# Patient Record
Sex: FEMALE | Race: WHITE | NOT HISPANIC OR LATINO
[De-identification: names, ages, dates, MRNs, and addresses within clinical notes are randomized per-mention and may not be internally consistent; named-entity substitution may affect disease eponyms.]

---

## 2018-01-22 ENCOUNTER — RESULT REVIEW (OUTPATIENT)
Age: 58
End: 2018-01-22

## 2018-03-05 PROBLEM — Z00.00 ENCOUNTER FOR PREVENTIVE HEALTH EXAMINATION: Status: ACTIVE | Noted: 2018-03-05

## 2018-03-08 ENCOUNTER — APPOINTMENT (OUTPATIENT)
Dept: SURGERY | Facility: CLINIC | Age: 58
End: 2018-03-08
Payer: COMMERCIAL

## 2018-03-08 VITALS
SYSTOLIC BLOOD PRESSURE: 160 MMHG | HEIGHT: 61 IN | HEART RATE: 76 BPM | WEIGHT: 180 LBS | DIASTOLIC BLOOD PRESSURE: 94 MMHG | BODY MASS INDEX: 33.99 KG/M2

## 2018-03-08 DIAGNOSIS — Z86.39 PERSONAL HISTORY OF OTHER ENDOCRINE, NUTRITIONAL AND METABOLIC DISEASE: ICD-10-CM

## 2018-03-08 DIAGNOSIS — Z80.8 FAMILY HISTORY OF MALIGNANT NEOPLASM OF OTHER ORGANS OR SYSTEMS: ICD-10-CM

## 2018-03-08 DIAGNOSIS — Z78.9 OTHER SPECIFIED HEALTH STATUS: ICD-10-CM

## 2018-03-08 PROCEDURE — 99204 OFFICE O/P NEW MOD 45 MIN: CPT

## 2018-03-08 RX ORDER — LEVOTHYROXINE SODIUM 112 UG/1
112 TABLET ORAL
Refills: 0 | Status: ACTIVE | COMMUNITY

## 2018-04-11 ENCOUNTER — RESULT REVIEW (OUTPATIENT)
Age: 58
End: 2018-04-11

## 2018-04-25 ENCOUNTER — OUTPATIENT (OUTPATIENT)
Dept: OUTPATIENT SERVICES | Facility: HOSPITAL | Age: 58
LOS: 1 days | End: 2018-04-25
Payer: COMMERCIAL

## 2018-04-25 VITALS
WEIGHT: 177.91 LBS | DIASTOLIC BLOOD PRESSURE: 90 MMHG | HEART RATE: 72 BPM | SYSTOLIC BLOOD PRESSURE: 130 MMHG | TEMPERATURE: 98 F | HEIGHT: 61.5 IN | RESPIRATION RATE: 14 BRPM

## 2018-04-25 DIAGNOSIS — C73 MALIGNANT NEOPLASM OF THYROID GLAND: ICD-10-CM

## 2018-04-25 DIAGNOSIS — Z98.891 HISTORY OF UTERINE SCAR FROM PREVIOUS SURGERY: Chronic | ICD-10-CM

## 2018-04-25 LAB
BUN SERPL-MCNC: 14 MG/DL — SIGNIFICANT CHANGE UP (ref 7–23)
CALCIUM SERPL-MCNC: 9.6 MG/DL — SIGNIFICANT CHANGE UP (ref 8.4–10.5)
CHLORIDE SERPL-SCNC: 100 MMOL/L — SIGNIFICANT CHANGE UP (ref 98–107)
CO2 SERPL-SCNC: 28 MMOL/L — SIGNIFICANT CHANGE UP (ref 22–31)
CREAT SERPL-MCNC: 0.83 MG/DL — SIGNIFICANT CHANGE UP (ref 0.5–1.3)
GLUCOSE SERPL-MCNC: 87 MG/DL — SIGNIFICANT CHANGE UP (ref 70–99)
HCT VFR BLD CALC: 43.4 % — SIGNIFICANT CHANGE UP (ref 34.5–45)
HGB BLD-MCNC: 14.1 G/DL — SIGNIFICANT CHANGE UP (ref 11.5–15.5)
MCHC RBC-ENTMCNC: 30.3 PG — SIGNIFICANT CHANGE UP (ref 27–34)
MCHC RBC-ENTMCNC: 32.5 % — SIGNIFICANT CHANGE UP (ref 32–36)
MCV RBC AUTO: 93.1 FL — SIGNIFICANT CHANGE UP (ref 80–100)
NRBC # FLD: 0 — SIGNIFICANT CHANGE UP
PLATELET # BLD AUTO: 316 K/UL — SIGNIFICANT CHANGE UP (ref 150–400)
PMV BLD: 10.1 FL — SIGNIFICANT CHANGE UP (ref 7–13)
POTASSIUM SERPL-MCNC: 4.4 MMOL/L — SIGNIFICANT CHANGE UP (ref 3.5–5.3)
POTASSIUM SERPL-SCNC: 4.4 MMOL/L — SIGNIFICANT CHANGE UP (ref 3.5–5.3)
RBC # BLD: 4.66 M/UL — SIGNIFICANT CHANGE UP (ref 3.8–5.2)
RBC # FLD: 12.4 % — SIGNIFICANT CHANGE UP (ref 10.3–14.5)
SODIUM SERPL-SCNC: 141 MMOL/L — SIGNIFICANT CHANGE UP (ref 135–145)
WBC # BLD: 5.97 K/UL — SIGNIFICANT CHANGE UP (ref 3.8–10.5)
WBC # FLD AUTO: 5.97 K/UL — SIGNIFICANT CHANGE UP (ref 3.8–10.5)

## 2018-04-25 PROCEDURE — 93010 ELECTROCARDIOGRAM REPORT: CPT

## 2018-04-25 RX ORDER — SODIUM CHLORIDE 9 MG/ML
1000 INJECTION, SOLUTION INTRAVENOUS
Qty: 0 | Refills: 0 | Status: DISCONTINUED | OUTPATIENT
Start: 2018-05-07 | End: 2018-05-08

## 2018-04-25 NOTE — H&P PST ADULT - VISION (WITH CORRECTIVE LENSES IF THE PATIENT USUALLY WEARS THEM):
distance and reading glasses/Normal vision: sees adequately in most situations; can see medication labels, newsprint

## 2018-04-25 NOTE — H&P PST ADULT - PMH
Hypothyroidism    Malignant neoplasm of thyroid gland    Seasonal allergies Hypothyroidism    Malignant neoplasm of thyroid gland    Obesity    Seasonal allergies

## 2018-04-25 NOTE — H&P PST ADULT - HISTORY OF PRESENT ILLNESS
59y/o female prsents for preop eval fro scheduled tota 57y/o female prsents for preop eval fro scheduled total thyroidectomy on 5/7/2018. Pt states dx with thyroid nodules several yrs ago, followed by PCP, increased growth on recent imaging and recent biopsy positive for malignancy. Pt with c/o occasional voice disturbance.

## 2018-04-25 NOTE — H&P PST ADULT - PROBLEM SELECTOR PLAN 1
scheduled total thyroidectomy on 5/7/2018.   preop instructions, gi prophylaxis & surgical soap given  pt verbalized understanding

## 2018-05-06 ENCOUNTER — TRANSCRIPTION ENCOUNTER (OUTPATIENT)
Age: 58
End: 2018-05-06

## 2018-05-07 ENCOUNTER — OTHER (OUTPATIENT)
Age: 58
End: 2018-05-07

## 2018-05-07 ENCOUNTER — APPOINTMENT (OUTPATIENT)
Dept: SURGERY | Facility: HOSPITAL | Age: 58
End: 2018-05-07

## 2018-05-07 ENCOUNTER — OUTPATIENT (OUTPATIENT)
Dept: OUTPATIENT SERVICES | Facility: HOSPITAL | Age: 58
LOS: 1 days | Discharge: HOME | End: 2018-05-07
Payer: COMMERCIAL

## 2018-05-07 VITALS
SYSTOLIC BLOOD PRESSURE: 120 MMHG | DIASTOLIC BLOOD PRESSURE: 70 MMHG | HEART RATE: 74 BPM | OXYGEN SATURATION: 98 % | RESPIRATION RATE: 14 BRPM

## 2018-05-07 VITALS
TEMPERATURE: 98 F | OXYGEN SATURATION: 98 % | HEIGHT: 61.5 IN | RESPIRATION RATE: 16 BRPM | DIASTOLIC BLOOD PRESSURE: 94 MMHG | WEIGHT: 177.91 LBS | HEART RATE: 78 BPM | SYSTOLIC BLOOD PRESSURE: 133 MMHG

## 2018-05-07 DIAGNOSIS — Z98.891 HISTORY OF UTERINE SCAR FROM PREVIOUS SURGERY: Chronic | ICD-10-CM

## 2018-05-07 DIAGNOSIS — C73 MALIGNANT NEOPLASM OF THYROID GLAND: ICD-10-CM

## 2018-05-07 LAB
CALCIUM SERPL-MCNC: 8.9 MG/DL — SIGNIFICANT CHANGE UP (ref 8.4–10.5)
CALCIUM SERPL-MCNC: 9 MG/DL — SIGNIFICANT CHANGE UP (ref 8.4–10.5)

## 2018-05-07 PROCEDURE — 60252 REMOVAL OF THYROID: CPT | Mod: AS

## 2018-05-07 PROCEDURE — 13132 CMPLX RPR F/C/C/M/N/AX/G/H/F: CPT | Mod: 59

## 2018-05-07 PROCEDURE — 60252 REMOVAL OF THYROID: CPT

## 2018-05-07 RX ORDER — OXYCODONE AND ACETAMINOPHEN 5; 325 MG/1; MG/1
1 TABLET ORAL EVERY 4 HOURS
Qty: 0 | Refills: 0 | Status: DISCONTINUED | OUTPATIENT
Start: 2018-05-07 | End: 2018-05-08

## 2018-05-07 RX ORDER — ACETAMINOPHEN 500 MG
650 TABLET ORAL EVERY 6 HOURS
Qty: 0 | Refills: 0 | Status: DISCONTINUED | OUTPATIENT
Start: 2018-05-07 | End: 2018-05-08

## 2018-05-07 RX ORDER — CALCIUM CARBONATE 500(1250)
1 TABLET ORAL
Qty: 0 | Refills: 0 | Status: DISCONTINUED | OUTPATIENT
Start: 2018-05-07 | End: 2018-05-08

## 2018-05-07 RX ORDER — LEVOCETIRIZINE DIHYDROCHLORIDE 0.5 MG/ML
1 SOLUTION ORAL
Qty: 0 | Refills: 0 | COMMUNITY

## 2018-05-07 RX ORDER — LEVOTHYROXINE SODIUM 125 MCG
1 TABLET ORAL
Qty: 0 | Refills: 0 | COMMUNITY

## 2018-05-07 RX ORDER — CALCIUM GLUCONATE 100 MG/ML
1 VIAL (ML) INTRAVENOUS ONCE
Qty: 0 | Refills: 0 | Status: COMPLETED | OUTPATIENT
Start: 2018-05-07 | End: 2018-05-07

## 2018-05-07 RX ORDER — ACETAMINOPHEN 500 MG
2 TABLET ORAL
Qty: 0 | Refills: 0 | COMMUNITY
Start: 2018-05-07

## 2018-05-07 RX ORDER — CALCIUM CARBONATE 500(1250)
1 TABLET ORAL
Qty: 0 | Refills: 0 | COMMUNITY
Start: 2018-05-07

## 2018-05-07 RX ADMIN — SODIUM CHLORIDE 50 MILLILITER(S): 9 INJECTION, SOLUTION INTRAVENOUS at 09:45

## 2018-05-07 RX ADMIN — Medication 1 TABLET(S): at 13:27

## 2018-05-07 RX ADMIN — Medication 200 GRAM(S): at 09:43

## 2018-05-07 NOTE — ASU PATIENT PROFILE, ADULT - VISION (WITH CORRECTIVE LENSES IF THE PATIENT USUALLY WEARS THEM):
Partially impaired: cannot see medication labels or newsprint, but can see obstacles in path, and the surrounding layout; can count fingers at arm's length/distance and reading glasses

## 2018-05-07 NOTE — ASU DISCHARGE PLAN (ADULT/PEDIATRIC). - MEDICATION SUMMARY - MEDICATIONS TO TAKE
I will START or STAY ON the medications listed below when I get home from the hospital:    acetaminophen 325 mg oral tablet  -- 2 tab(s) by mouth every 6 hours, As needed, Moderate Pain (4 - 6)  -- Indication: For Malignant neoplasm of thyroid gland    calcium carbonate 500 mg (200 mg elemental calcium) oral tablet, chewable  -- 1 tab(s) by mouth 4 times a day  -- Indication: For Malignant neoplasm of thyroid gland    Xyzal 5 mg oral tablet  -- 1 tab(s) by mouth once a day (in the evening)  -- Indication: For Malignant neoplasm of thyroid gland    Synthroid 112 mcg (0.112 mg) oral tablet  -- 1 tab(s) by mouth once a day  -- Indication: For Malignant neoplasm of thyroid gland

## 2018-05-07 NOTE — ASU DISCHARGE PLAN (ADULT/PEDIATRIC). - NURSING INSTRUCTIONS
You were given 1000mg IV Tylenol for pain management.  Please DO NOT take any Tylenol containing products, such as  Vicodin, Percocet, Excedrin, many cold preparations for the next 6 hours (until 245p).  DO NOT EXCEED 3000MG OF TYLENOL OVER 24 HOURS.   Please DO Not take Motrin/Ibuprofen/Advil/Aleve (NSAIDS) for the next several days.

## 2018-05-08 ENCOUNTER — RESULT REVIEW (OUTPATIENT)
Age: 58
End: 2018-05-08

## 2018-05-08 ENCOUNTER — APPOINTMENT (OUTPATIENT)
Dept: SURGERY | Facility: CLINIC | Age: 58
End: 2018-05-08
Payer: COMMERCIAL

## 2018-05-08 PROCEDURE — 88307 TISSUE EXAM BY PATHOLOGIST: CPT | Mod: 26

## 2018-05-08 PROCEDURE — 99024 POSTOP FOLLOW-UP VISIT: CPT

## 2018-05-10 LAB — SURGICAL PATHOLOGY STUDY: SIGNIFICANT CHANGE UP

## 2018-05-17 ENCOUNTER — OTHER (OUTPATIENT)
Age: 58
End: 2018-05-17

## 2018-06-21 ENCOUNTER — APPOINTMENT (OUTPATIENT)
Dept: SURGERY | Facility: CLINIC | Age: 58
End: 2018-06-21
Payer: COMMERCIAL

## 2018-06-21 PROCEDURE — 99024 POSTOP FOLLOW-UP VISIT: CPT

## 2018-06-21 RX ORDER — LEVOTHYROXINE SODIUM 0.05 MG/1
50 TABLET ORAL
Qty: 12 | Refills: 0 | Status: ACTIVE | COMMUNITY
Start: 2018-06-08

## 2018-11-08 ENCOUNTER — APPOINTMENT (OUTPATIENT)
Dept: SURGERY | Facility: CLINIC | Age: 58
End: 2018-11-08
Payer: COMMERCIAL

## 2018-11-08 PROCEDURE — 99213 OFFICE O/P EST LOW 20 MIN: CPT

## 2019-05-01 PROBLEM — J30.2 OTHER SEASONAL ALLERGIC RHINITIS: Chronic | Status: ACTIVE | Noted: 2018-04-25

## 2019-05-01 PROBLEM — E03.9 HYPOTHYROIDISM, UNSPECIFIED: Chronic | Status: ACTIVE | Noted: 2018-04-25

## 2019-05-01 PROBLEM — E66.9 OBESITY, UNSPECIFIED: Chronic | Status: ACTIVE | Noted: 2018-04-25

## 2019-05-01 PROBLEM — C73 MALIGNANT NEOPLASM OF THYROID GLAND: Chronic | Status: ACTIVE | Noted: 2018-04-25

## 2019-05-20 ENCOUNTER — APPOINTMENT (OUTPATIENT)
Dept: NUCLEAR MEDICINE | Facility: CLINIC | Age: 59
End: 2019-05-20
Payer: COMMERCIAL

## 2019-05-20 ENCOUNTER — OUTPATIENT (OUTPATIENT)
Dept: OUTPATIENT SERVICES | Facility: HOSPITAL | Age: 59
LOS: 1 days | End: 2019-05-20

## 2019-05-20 DIAGNOSIS — Z00.8 ENCOUNTER FOR OTHER GENERAL EXAMINATION: ICD-10-CM

## 2019-05-20 DIAGNOSIS — Z98.891 HISTORY OF UTERINE SCAR FROM PREVIOUS SURGERY: Chronic | ICD-10-CM

## 2019-05-21 ENCOUNTER — TRANSCRIPTION ENCOUNTER (OUTPATIENT)
Age: 59
End: 2019-05-21

## 2019-05-21 ENCOUNTER — APPOINTMENT (OUTPATIENT)
Dept: NUCLEAR MEDICINE | Facility: CLINIC | Age: 59
End: 2019-05-21

## 2019-05-22 ENCOUNTER — APPOINTMENT (OUTPATIENT)
Dept: NUCLEAR MEDICINE | Facility: CLINIC | Age: 59
End: 2019-05-22

## 2019-05-24 ENCOUNTER — APPOINTMENT (OUTPATIENT)
Dept: NUCLEAR MEDICINE | Facility: CLINIC | Age: 59
End: 2019-05-24

## 2019-05-24 PROCEDURE — 78018 THYROID MET IMAGING BODY: CPT | Mod: 26

## 2019-07-17 NOTE — ASU PATIENT PROFILE, ADULT - MEDICATION ADMINISTRATION INFO, PROFILE
Price (Use Numbers Only, No Special Characters Or $): 935 Price (Use Numbers Only, No Special Characters Or $): 597 no concerns

## 2019-11-13 ENCOUNTER — APPOINTMENT (OUTPATIENT)
Dept: ORTHOPEDIC SURGERY | Facility: CLINIC | Age: 59
End: 2019-11-13
Payer: COMMERCIAL

## 2019-11-13 VITALS
HEIGHT: 61 IN | SYSTOLIC BLOOD PRESSURE: 141 MMHG | WEIGHT: 180 LBS | HEART RATE: 79 BPM | DIASTOLIC BLOOD PRESSURE: 82 MMHG | BODY MASS INDEX: 33.99 KG/M2

## 2019-11-13 PROCEDURE — 99204 OFFICE O/P NEW MOD 45 MIN: CPT

## 2019-11-13 PROCEDURE — 73562 X-RAY EXAM OF KNEE 3: CPT | Mod: RT

## 2019-11-13 NOTE — DISCUSSION/SUMMARY
[de-identified] : 59 year old  female with concern for medial meniscus tear of the right knee.  She was reassured that her right knee xrays obtained today showed no evidence of severe osteoarthritis nor acute orthopedic pathology. Based on her recent injury and physical exam findings I ordered an MRI of the right knee to rule-out medial meniscus tear. We also discussed use of NSAIDs sparingly for pain relief. She will call the office when MRI result is available.

## 2019-11-13 NOTE — ADDENDUM
[FreeTextEntry1] : I, Chung Hansen, acted solely as a scribe for Dr. Darin Kitchen on this date 11/13/2019.

## 2019-11-13 NOTE — PHYSICAL EXAM
[LE] : Sensory: Intact in bilateral lower extremities [ALL] : dorsalis pedis, posterior tibial, femoral, popliteal, and radial 2+ and symmetric bilaterally [Normal] : Alert and in no acute distress [Obese] : obese [Antalgic] : not antalgic [Poor Appearance] : well-appearing [Acute Distress] : not in acute distress [de-identified] : GENERAL APPEARANCE: Well nourished and hydrated, pleasant, alert, and oriented x 3. Appears their stated age. \par HEENT: Normocephalic, extraocular eye motion intact. Nasal septum midline. Oral cavity clear. External auditory canal clear. \par RESPIRATORY: Breath sounds clear and audible in all lobes. No wheezing, No accessory muscle use.\par CARDIOVASCULAR: No apparent abnormalities. No lower leg edema. No varicosities. Pedal pulses are palpable.\par NEUROLOGIC: Sensation is normal, no muscle weakness in the upper or lower extremities.\par DERMATOLOGIC: No apparent skin lesions, moist, warm, no rash.\par SPINE: Cervical spine appears normal and moves freely; thoracic spine appears normal and moves freely; lumbosacral spine appears normal and moves freely, normal, nontender.\par MUSCULOSKELETAL: Hands, wrists, and elbows are normal and move freely, shoulders are normal and move freely.  [de-identified] : Right knee exam shows medial joint line tenderness, pain with flexion, moderate joint effusion present, neutral alignment, preserved ROM, positive medial Kobe's test.  [de-identified] : 3V Xray of the right knee done in office today and reviewed by Dr. Darin Kitchen demonstrates well-preserved joint without findings or any evidence of fracture, dislocation, or any other acute orthopedic pathology. There is no radiographic features of severe OA present. \par \par \par

## 2019-11-13 NOTE — HISTORY OF PRESENT ILLNESS
[7] : a current pain level of 7/10 [Intermit.] : ~He/She~ states the symptoms seem to be intermittent [Bending] : worsened by bending [Walking] : worsened by walking [Rest] : relieved by rest [Recumbency] : relieved by recumbency [de-identified] : Patient is a 59 year old female who presents c/o right knee pain since September 2019 patient states was getting out of car and felt pinch to knee. patient states pain located to posterior and medial knee. patient states positive swelling. patient states has pain with flexion of knee.  Describes pain as burning and throbbing. patient PMD performed xray, and diclofenac as needed for pain.  patient states pain started to decrease last week, however suddenly returned. She c/o intermittent locking to the right knee.

## 2019-11-13 NOTE — REVIEW OF SYSTEMS
[Joint Pain] : joint pain [Joint Stiffness] : joint stiffness [Joint Swelling] : joint swelling [Negative] : Endocrine [FreeTextEntry9] : right knee

## 2019-12-27 ENCOUNTER — APPOINTMENT (OUTPATIENT)
Dept: ORTHOPEDIC SURGERY | Facility: CLINIC | Age: 59
End: 2019-12-27
Payer: COMMERCIAL

## 2019-12-27 VITALS
WEIGHT: 185 LBS | SYSTOLIC BLOOD PRESSURE: 131 MMHG | HEART RATE: 74 BPM | DIASTOLIC BLOOD PRESSURE: 85 MMHG | BODY MASS INDEX: 34.48 KG/M2 | HEIGHT: 61.5 IN

## 2019-12-27 DIAGNOSIS — S83.241A OTHER TEAR OF MEDIAL MENISCUS, CURRENT INJURY, RIGHT KNEE, INITIAL ENCOUNTER: ICD-10-CM

## 2019-12-27 PROCEDURE — 20610 DRAIN/INJ JOINT/BURSA W/O US: CPT | Mod: RT

## 2019-12-27 PROCEDURE — 99214 OFFICE O/P EST MOD 30 MIN: CPT | Mod: 25

## 2019-12-27 NOTE — HISTORY OF PRESENT ILLNESS
[Pain Location] : pain [Worsening] : worsening [Intermit.] : ~He/She~ states the symptoms seem to be intermittent [Bending] : worsened by bending [Walking] : worsened by walking [Rest] : relieved by rest [de-identified] : Patient is a 59 year old female who presents for follow up of right knee. patient was seen in November 2019 for pain.  patient was sent for MRI, states pain has increased since. patient states pain is located to medial knee, and she c/o sensations of pressure and tightness to anterior knee. patient states WB and driving increases pain.  patient has not had PT for pain.  patient denies buckling or locking. Patient using Diclofenac gel without relief.

## 2019-12-27 NOTE — ADDENDUM
[FreeTextEntry1] : I, Chung Hansen, acted solely as a scribe for Dr. Darin Kitchen on this date 12/27/2019.

## 2019-12-27 NOTE — PROCEDURE
[de-identified] : I injected the patient's right knee today with cortisone.\par \par I discussed at length with the patient the planned steroid and lidocaine injection. The risks, benefits, convalescence and alternatives were reviewed. The possible side effects discussed included but were not limited to: pain, swelling, heat, bleeding, and redness. Symptoms are generally mild but if they are extensive then contact the office. Giving pain relievers by mouth such as NSAIDs or Tylenol can generally treat the reactions to steroid and lidocaine. Rare cases of infection have been noted. Rash, hives and itching may occur post injection. If you have muscle pain or cramps, flushing and or swelling of the face, rapid heart beat, nausea, dizziness, fever, chills, headache, difficulty breathing, swelling in the arms or legs, or have a prickly feeling of your skin, contact a health care provider immediately. Following this discussion, the knee was prepped with Alcohol and under sterile condition the 80 mg Depo-Medrol and 6 cc Lidocaine injection was performed with a 20 gauge needle through a superolateral injection site. The needle was introduced into the joint, aspiration was performed to ensure intra-articular placement and the medication was injected. Upon withdrawal of the needle the site was cleaned with alcohol and a band aid applied. The patient tolerated the injection well and there were no adverse effects. Post injection instructions included no strenuous activity for 24 hours, cryotherapy and if there are any adverse effects to contact the office. \par \par \par

## 2019-12-27 NOTE — DISCUSSION/SUMMARY
[Surgical risks reviewed] : Surgical risks reviewed [de-identified] : 59 year old  female with right knee medial meniscus tear. \par We discussed her right knee MRI result, and based on imaging she is a candidate for right knee arthroscopy. She may consider pursuing surgery after a trial of conservative treatment. She elected to receive a cortisone injection in her right knee which she tolerated well. I encouraged her to begin physical therapy and I provided her with rx for outpatient PT. She may follow-up in 6 weeks for reassessment. \par \par The percentages of success in an arthroscopy that involves a torn meniscus and arthritic changes is dependent upon how bad the arthritic changes are. Basically, removing a meniscal tear allows us to ascertain how bad the patient's articular cartilage destruction (arthritis) is. The arthroscopy cleans out any debris from the arthritic process as well as removing the meniscal tear. Approximately 75% of the patients will say that they feel relief, although their x-rays will continue to show significant arthritic changes. Arthroscopy for arthritis is a temporizing procedure, yielding subjective success (patient satisfaction) for less than two to five years. In some cases, the knee might eventually require a knee replacement for symptomatic relief. The prognostic factors that are somewhat favorable predictive values in arthroscopic debridements (removal of loose articular cartilage, loose body and inflamed synovium) of an arthritic knee are: short duration of symptoms, effusion (swelling), minimal deformity and good range of motion. The complications with any arthroscopy include the risk of anaesthetic complications and death, blood clots and pulmonary embolus, infection (less than 1%), nerve damage, by which we would mean a peroneal palsy (less than 0.1%) (small area of skin numbness is so common, we do not consider its presence a complication), injury to the popliteal artery, which is so rare that there are no statistics, but should it occur could theoretically lead to amputation, which is extremely unlikely. There is often a chance of getting a hemarthrosis (blood in the joint) but this usually resolves with local measures of icing, physical therapy, and aspiration. Reflex sympathetic dystrophy (RSD) is another extremely rare but theoretical complication. This (RSD) means that the patient has a stiff painful joint that is out of proportion to the objective pathology of the knee. Subsequently, it might require years of physical therapy before one regains a functional knee with RSD. Infrapatellar contracture syndrome (stiff joint) is sometimes reported and associated with RSD, but it usually is a result of not being aggressive in physical therapy. I think the patient understands the risk benefit ratio of arthroscopy and will think about whether they would prefer the nonoperative or surgical treatment option. \par \par \par

## 2019-12-27 NOTE — PHYSICAL EXAM
[LE] : Sensory: Intact in bilateral lower extremities [ALL] : dorsalis pedis, posterior tibial, femoral, popliteal, and radial 2+ and symmetric bilaterally [Normal] : Alert and in no acute distress [Obese] : obese [Antalgic] : not antalgic [Acute Distress] : not in acute distress [Poor Appearance] : well-appearing [de-identified] : GENERAL APPEARANCE: Well nourished and hydrated, pleasant, alert, and oriented x 3. Appears their stated age. \par HEENT: Normocephalic, extraocular eye motion intact. Nasal septum midline. Oral cavity clear. External auditory canal clear. \par RESPIRATORY: Breath sounds clear and audible in all lobes. No wheezing, No accessory muscle use.\par CARDIOVASCULAR: No apparent abnormalities. No lower leg edema. No varicosities. Pedal pulses are palpable.\par NEUROLOGIC: Sensation is normal, no muscle weakness in the upper or lower extremities.\par DERMATOLOGIC: No apparent skin lesions, moist, warm, no rash.\par SPINE: Cervical spine appears normal and moves freely; thoracic spine appears normal and moves freely; lumbosacral spine appears normal and moves freely, normal, nontender.\par MUSCULOSKELETAL: Hands, wrists, and elbows are normal and move freely, shoulders are normal and move freely.  [de-identified] : right hip exam shows able to SLR without pain, no pain with Stinchfield maneuver, FROM. \par Right knee exam shows medial joint line tenderness, mild lateral joint line tenderness, pain with flexion, slight joint effusion present, neutral alignment, preserved ROM, positive medial Kobe's test. \par  [de-identified] : MRI of the right knee obtained 11/25/2019 at [facility] shows small joint effusion. Minimal lateral patellar subluxation. Chronic low-grade sprain of the medial collateral ligament as detailed above. Medial meniscus tear. Small full-thickness chondral fissure over the femoral trochlear sulcus.

## 2020-01-02 ENCOUNTER — APPOINTMENT (OUTPATIENT)
Dept: SURGERY | Facility: CLINIC | Age: 60
End: 2020-01-02
Payer: COMMERCIAL

## 2020-01-02 PROCEDURE — 99213 OFFICE O/P EST LOW 20 MIN: CPT

## 2020-01-02 NOTE — HISTORY OF PRESENT ILLNESS
[de-identified] : 1 1/2 years  s/p total thyroidectomy for malignancy. feels well on  Synthroid 112 mcg 6 doses weekly  and 50 mcg on Sunday. denies dysphagia, hoarseness or perioral or  hand tingling. no changes medically since last visit. apparently had workup for cervical adenopathy which has since resolved - benign cytology , normal Thyrogen scan.  TFT's reportedly normal

## 2020-01-02 NOTE — PHYSICAL EXAM
[de-identified] : well healed scar [de-identified] : no palpable thyroid bed nodules [Laryngoscopy Performed] : laryngoscopy was performed, see procedure section for findings [Midline] : located in midline position [Normal] : cranial nerves 2-12 intact [de-identified] : negative Chvostek's sign

## 2020-03-27 ENCOUNTER — APPOINTMENT (OUTPATIENT)
Dept: ORTHOPEDIC SURGERY | Facility: CLINIC | Age: 60
End: 2020-03-27

## 2020-09-21 ENCOUNTER — APPOINTMENT (OUTPATIENT)
Dept: ORTHOPEDIC SURGERY | Facility: CLINIC | Age: 60
End: 2020-09-21
Payer: COMMERCIAL

## 2020-09-21 VITALS
HEART RATE: 95 BPM | BODY MASS INDEX: 34.48 KG/M2 | DIASTOLIC BLOOD PRESSURE: 77 MMHG | WEIGHT: 185 LBS | HEIGHT: 61.5 IN | SYSTOLIC BLOOD PRESSURE: 133 MMHG

## 2020-09-21 DIAGNOSIS — M17.11 UNILATERAL PRIMARY OSTEOARTHRITIS, RIGHT KNEE: ICD-10-CM

## 2020-09-21 PROCEDURE — 99214 OFFICE O/P EST MOD 30 MIN: CPT | Mod: 25

## 2020-09-21 PROCEDURE — 73562 X-RAY EXAM OF KNEE 3: CPT | Mod: 50

## 2020-09-21 PROCEDURE — 20610 DRAIN/INJ JOINT/BURSA W/O US: CPT | Mod: LT

## 2020-09-21 RX ORDER — MELOXICAM 15 MG/1
15 TABLET ORAL DAILY
Qty: 30 | Refills: 1 | Status: ACTIVE | COMMUNITY
Start: 2020-09-21 | End: 1900-01-01

## 2020-09-21 NOTE — PROCEDURE
[de-identified] : I injected the patient's left  knee today with cortisone.\par \par I discussed at length with the patient the planned steroid and lidocaine injection. The risks, benefits, convalescence and alternatives were reviewed. The possible side effects discussed included but were not limited to: pain, swelling, heat, bleeding, and redness. Symptoms are generally mild but if they are extensive then contact the office. Giving pain relievers by mouth such as NSAIDs or Tylenol can generally treat the reactions to steroid and lidocaine. Rare cases of infection have been noted. Rash, hives and itching may occur post injection. If you have muscle pain or cramps, flushing and or swelling of the face, rapid heart beat, nausea, dizziness, fever, chills, headache, difficulty breathing, swelling in the arms or legs, or have a prickly feeling of your skin, contact a health care provider immediately. Following this discussion, the knee was prepped with Alcohol and under sterile condition the 80 mg Depo-Medrol and 6 cc Lidocaine injection was performed with a 20 gauge needle through a superolateral injection site. The needle was introduced into the joint, aspiration was performed to ensure intra-articular placement and the medication was injected. Upon withdrawal of the needle the site was cleaned with alcohol and a band aid applied. The patient tolerated the injection well and there were no adverse effects. Post injection instructions included no strenuous activity for 24 hours, cryotherapy and if there are any adverse effects to contact the office. \par \par \par

## 2020-09-21 NOTE — DISCUSSION/SUMMARY
[Surgical risks reviewed] : Surgical risks reviewed [de-identified] : 60 year old female with right knee medial meniscus tear and mild osteoarthritis of the b/l knees. Conservative therapy and surgical options discussed in detail with the patient. Pt opted to receive a cortisone injection in the left knee today and tolerated it well. If the patient does not receive relief from the cortisone injection, she will get an MRI of her right knee. We prescribed meloxicam today. She will take it once a day for two weeks and then will take it as needed. F/u with us in three months for repeat cortisone injections if needed. \par \par \par The percentages of success in an arthroscopy that involves a torn meniscus and arthritic changes is dependent upon how bad the arthritic changes are. Basically, removing a meniscal tear allows us to ascertain how bad the patient's articular cartilage destruction (arthritis) is. The arthroscopy cleans out any debris from the arthritic process as well as removing the meniscal tear. Approximately 75% of the patients will say that they feel relief, although their x-rays will continue to show significant arthritic changes. Arthroscopy for arthritis is a temporizing procedure, yielding subjective success (patient satisfaction) for less than two to five years. In some cases, the knee might eventually require a knee replacement for symptomatic relief. The prognostic factors that are somewhat favorable predictive values in arthroscopic debridements (removal of loose articular cartilage, loose body and inflamed synovium) of an arthritic knee are: short duration of symptoms, effusion (swelling), minimal deformity and good range of motion. The complications with any arthroscopy include the risk of anaesthetic complications and death, blood clots and pulmonary embolus, infection (less than 1%), nerve damage, by which we would mean a peroneal palsy (less than 0.1%) (small area of skin numbness is so common, we do not consider its presence a complication), injury to the popliteal artery, which is so rare that there are no statistics, but should it occur could theoretically lead to amputation, which is extremely unlikely. There is often a chance of getting a hemarthrosis (blood in the joint) but this usually resolves with local measures of icing, physical therapy, and aspiration. Reflex sympathetic dystrophy (RSD) is another extremely rare but theoretical complication. This (RSD) means that the patient has a stiff painful joint that is out of proportion to the objective pathology of the knee. Subsequently, it might require years of physical therapy before one regains a functional knee with RSD. Infrapatellar contracture syndrome (stiff joint) is sometimes reported and associated with RSD, but it usually is a result of not being aggressive in physical therapy. I think the patient understands the risk benefit ratio of arthroscopy and will think about whether they would prefer the nonoperative or surgical treatment option. \par

## 2020-09-21 NOTE — HISTORY OF PRESENT ILLNESS
[Pain Location] : pain [Worsening] : worsening [Intermit.] : ~He/She~ states the symptoms seem to be intermittent [Bending] : worsened by bending [Walking] : worsened by walking [Rest] : relieved by rest [de-identified] : 61 y/o F presents with b/l knee pain, left knee pain is worse than right knee pain. She notes pain with sitting and waling. Tylenol does not provide relief, but Advil does. Patient was seen in December 2019 for right knee pain. Patient was sent for MRI, states pain has increased since. Patient states pain is in the medial and lateral aspect of the b/l knees, and she c/o sensations of pressure and tightness to anterior knee. Patient states WB and driving increases pain. Patient has not had PT for pain. Patient denies buckling or locking. Patient using Diclofenac gel without relief.  [Ataxia] : no ataxia [Incontinence] : no incontinence [Loss of Dexterity] : good dexterity [Urinary Ret.] : no urinary retention

## 2020-09-21 NOTE — PHYSICAL EXAM
[LE] : Sensory: Intact in bilateral lower extremities [ALL] : dorsalis pedis, posterior tibial, femoral, popliteal, and radial 2+ and symmetric bilaterally [Normal] : Alert and in no acute distress [Obese] : obese [Antalgic] : not antalgic [Poor Appearance] : well-appearing [Acute Distress] : not in acute distress [de-identified] : GENERAL APPEARANCE: Well nourished and hydrated, pleasant, alert, and oriented x 3. Appears their stated age. \par HEENT: Normocephalic, extraocular eye motion intact. Nasal septum midline. Oral cavity clear. External auditory canal clear. \par RESPIRATORY: Breath sounds clear and audible in all lobes. No wheezing, No accessory muscle use.\par CARDIOVASCULAR: No apparent abnormalities. No lower leg edema. No varicosities. Pedal pulses are palpable.\par NEUROLOGIC: Sensation is normal, no muscle weakness in the upper or lower extremities.\par DERMATOLOGIC: No apparent skin lesions, moist, warm, no rash.\par SPINE: Cervical spine appears normal and moves freely; thoracic spine appears normal and moves freely; lumbosacral spine appears normal and moves freely, normal, nontender.\par MUSCULOSKELETAL: Hands, wrists, and elbows are normal and move freely, shoulders are normal and move freely.  [de-identified] : right hip exam shows able to SLR without pain, no pain with Stinchfield maneuver, FROM. \par Right knee exam shows medial joint line tenderness, mild lateral joint line tenderness, pain with flexion, slight joint effusion present, neutral alignment, preserved ROM, positive medial Kobe's test. \par  [de-identified] : 3V xray of the b/l knees done in the office today and reviewed by Dr. Darin Kitchen demonstrates mild osteoarthritis with well preserved joint spaces bilaterally\par \par \par MRI of the right knee obtained 11/25/2019 at [facility] shows small joint effusion. Minimal lateral patellar subluxation. Chronic low-grade sprain of the medial collateral ligament as detailed above. Medial meniscus tear. Small full-thickness chondral fissure over the femoral trochlear sulcus.

## 2020-09-21 NOTE — END OF VISIT
[FreeTextEntry3] : I, Andrzej Villatoro, acted solely as a scribe for Dr. Darin Kitchen on this date 09/21/2020.

## 2020-11-11 NOTE — H&P PST ADULT - NECK DETAILS
PRAKASH-CECELIA spoke with patient on this date to follow up on mailing received. SW and patient discussed bills that patient is responsible for. SW to follow up on financial assistance application to make sure processed. Patient voiced understanding and will contact SW if any issues arise.
no JVD/thyroid nodule/preop dx thyroid cancer/supple

## 2021-01-11 ENCOUNTER — APPOINTMENT (OUTPATIENT)
Dept: ORTHOPEDIC SURGERY | Facility: CLINIC | Age: 61
End: 2021-01-11
Payer: COMMERCIAL

## 2021-01-11 PROCEDURE — 99442: CPT

## 2021-01-11 NOTE — DISCUSSION/SUMMARY
[de-identified] : The patient has increasing left knee pain.  We did get an MRI of her left knee.  It demonstrates medial meniscus degenerative tear as well as mild to moderate medial and patellofemoral osteoarthritis.  We did discuss the role of arthroscopy she would like to come in for an injection.  We will schedule her an appointment for an injection with us.\par \par If she does not respond to the injection we may consider left knee arthroscopy.\par \par \par The percentages of success in an arthroscopy that involves a torn meniscus and arthritic changes is dependent upon how bad the arthritic changes are. Basically, removing a meniscal tear allows us to ascertain how bad the patient's articular cartilage destruction (arthritis) is. The arthroscopy cleans out any debris from the arthritic process as well as removing the meniscal tear. Approximately 75% of the patients will say that they feel relief, although their x-rays will continue to show significant arthritic changes. Arthroscopy for arthritis is a temporizing procedure, yielding subjective success (patient satisfaction) for less than two to five years. In some cases, the knee might eventually require a knee replacement for symptomatic relief. The prognostic factors that are somewhat favorable predictive values in arthroscopic debridements (removal of loose articular cartilage, loose body and inflamed synovium) of an arthritic knee are: short duration of symptoms, effusion (swelling), minimal deformity and good range of motion. The complications with any arthroscopy include the risk of anaesthetic complications and death, blood clots and pulmonary embolus, infection (less than 1%), nerve damage, by which we would mean a peroneal palsy (less than 0.1%) (small area of skin numbness is so common, we do not consider its presence a complication), injury to the popliteal artery, which is so rare that there are no statistics, but should it occur could theoretically lead to amputation, which is extremely unlikely. There is often a chance of getting a hemarthrosis (blood in the joint) but this usually resolves with local measures of icing, physical therapy, and aspiration. Reflex sympathetic dystrophy (RSD) is another extremely rare but theoretical complication. This (RSD) means that the patient has a stiff painful joint that is out of proportion to the objective pathology of the knee. Subsequently, it might require years of physical therapy before one regains a functional knee with RSD. Infrapatellar contracture syndrome (stiff joint) is sometimes reported and associated with RSD, but it usually is a result of not being aggressive in physical therapy. I think the patient understands the risk benefit ratio of arthroscopy and will think about whether they would prefer the nonoperative or surgical treatment option.

## 2021-01-11 NOTE — REASON FOR VISIT
[Home] : at home, [unfilled] , at the time of the visit. [Medical Office: ([facility])___] : at the medical office located in  [Verbal consent obtained from patient] : the patient, [unfilled] [Follow-Up Visit] : a follow-up visit for

## 2021-01-11 NOTE — HISTORY OF PRESENT ILLNESS
[de-identified] : pt presented via [facility] to review her MRI results\par he underwent MRI of left knee on1/7/2020 showed medial degenerative meniscus tear and mild to moderate medial compartment O.A\par pt had similar finding of right knee but her right knee pain has responded to conservative treatment.\par pt denies right knee pain. \par pt had LEFT knee injection in 9/2020 which helped but she continues to have pain. \par

## 2021-01-20 ENCOUNTER — APPOINTMENT (OUTPATIENT)
Dept: ORTHOPEDIC SURGERY | Facility: CLINIC | Age: 61
End: 2021-01-20
Payer: COMMERCIAL

## 2021-01-20 VITALS
SYSTOLIC BLOOD PRESSURE: 140 MMHG | DIASTOLIC BLOOD PRESSURE: 81 MMHG | WEIGHT: 185 LBS | BODY MASS INDEX: 34.48 KG/M2 | HEIGHT: 61.5 IN

## 2021-01-20 PROCEDURE — 20610 DRAIN/INJ JOINT/BURSA W/O US: CPT | Mod: LT

## 2021-01-20 PROCEDURE — 99072 ADDL SUPL MATRL&STAF TM PHE: CPT

## 2021-01-20 PROCEDURE — 99214 OFFICE O/P EST MOD 30 MIN: CPT | Mod: 25

## 2021-01-20 NOTE — PHYSICAL EXAM
[de-identified] : GENERAL APPEARANCE: Well nourished and hydrated, pleasant, alert, and oriented x 3. Appears their stated age. \par HEENT: Normocephalic, extraocular eye motion intact. Nasal septum midline. Oral cavity clear. External auditory canal clear. \par RESPIRATORY: Breath sounds clear and audible in all lobes. No wheezing, No accessory muscle use.\par CARDIOVASCULAR: No apparent abnormalities. No lower leg edema. No varicosities. Pedal pulses are palpable.\par NEUROLOGIC: Sensation is normal, no muscle weakness in the upper or lower extremities.\par DERMATOLOGIC: No apparent skin lesions, moist, warm, no rash.\par SPINE: Cervical spine appears normal and moves freely; thoracic spine appears normal and moves freely; lumbosacral spine appears normal and moves freely, normal, nontender.\par MUSCULOSKELETAL: Hands, wrists, and elbows are normal and move freely, shoulders are normal and move freely. \par Musculoskeletal: Gait: normal and not antalgic . right hip exam shows able to SLR without pain, no pain with Stinchfield maneuver, FROM. \par Right knee exam shows medial joint line tenderness, mild lateral joint line tenderness, pain with flexion, slight joint effusion present, neutral alignment, preserved ROM, positive medial Kobe's test. \par  \par 5/5 motor strength in bilateral lower extremities. Sensory: Intact in bilateral lower extremities. DTRs: Biceps, brachioradialis, triceps, patellar, ankle and plantar 2+ and symmetric bilaterally. Pulses: dorsalis pedis, posterior tibial, femoral, popliteal, and radial 2+ and symmetric bilaterally. \par Constitutional: Alert and in no acute distress and obese, but well-appearing and not in acute distress. \par

## 2021-01-20 NOTE — PROCEDURE
[de-identified] : pt received left knee cortisone injection \par I discussed at length with the patient the planned steroid and lidocaine injection. The risks, benefits, convalescence and alternatives were reviewed. The possible side effects discussed included but were not limited to: pain, swelling, heat, bleeding, and redness. Symptoms are generally mild but if they are extensive then contact the office. Giving pain relievers by mouth such as NSAIDs or Tylenol can generally treat the reactions to steroid and lidocaine. Rare cases of infection have been noted. Rash, hives and itching may occur post injection. If you have muscle pain or cramps, flushing and or swelling of the face, rapid heart beat, nausea, dizziness, fever, chills, headache, difficulty breathing, swelling in the arms or legs, or have a prickly feeling of your skin, contact a health care provider immediately. Following this discussion, the knee was prepped with Alcohol and under sterile condition the 80 mg Depo-Medrol and 6 cc Lidocaine injection was performed with a 20 gauge needle through a superolateral injection site. The needle was introduced into the joint, aspiration was performed to ensure intra-articular placement and the medication was injected. Upon withdrawal of the needle the site was cleaned with alcohol and a band aid applied. The patient tolerated the injection well and there were no adverse effects. Post injection instructions included no strenuous activity for 24 hours, cryotherapy and if there are any adverse effects to contact the office. \par \par

## 2021-01-20 NOTE — DISCUSSION/SUMMARY
[de-identified] : Surgical risks reviewed. 61 year old female with left knee degenerative medial meniscus tear, and mild osteoarthritis of the b/l knees. she also has right knee medial meniscus tear but she is pain free in the right knee.  Conservative therapy and surgical options discussed in detail with the patient. Pt opted to receive a cortisone injection in the left knee today and tolerated it well. If the patient does not receive relief from the cortisone injection, we will consider arthroscopy. she will continue with meloxicam as needed. f/u in 3 month\par \par \par The percentages of success in an arthroscopy that involves a torn meniscus and arthritic changes is dependent upon how bad the arthritic changes are. Basically, removing a meniscal tear allows us to ascertain how bad the patient's articular cartilage destruction (arthritis) is. The arthroscopy cleans out any debris from the arthritic process as well as removing the meniscal tear. Approximately 75% of the patients will say that they feel relief, although their x-rays will continue to show significant arthritic changes. Arthroscopy for arthritis is a temporizing procedure, yielding subjective success (patient satisfaction) for less than two to five years. In some cases, the knee might eventually require a knee replacement for symptomatic relief. The prognostic factors that are somewhat favorable predictive values in arthroscopic debridements (removal of loose articular cartilage, loose body and inflamed synovium) of an arthritic knee are: short duration of symptoms, effusion (swelling), minimal deformity and good range of motion. The complications with any arthroscopy include the risk of anaesthetic complications and death, blood clots and pulmonary embolus, infection (less than 1%), nerve damage, by which we would mean a peroneal palsy (less than 0.1%) (small area of skin numbness is so common, we do not consider its presence a complication), injury to the popliteal artery, which is so rare that there are no statistics, but should it occur could theoretically lead to amputation, which is extremely unlikely. There is often a chance of getting a hemarthrosis (blood in the joint) but this usually resolves with local measures of icing, physical therapy, and aspiration. Reflex sympathetic dystrophy (RSD) is another extremely rare but theoretical complication. This (RSD) means that the patient has a stiff painful joint that is out of proportion to the objective pathology of the knee. Subsequently, it might require years of physical therapy before one regains a functional knee with RSD. Infrapatellar contracture syndrome (stiff joint) is sometimes reported and associated with RSD, but it usually is a result of not being aggressive in physical therapy. I think the patient understands the risk benefit ratio of arthroscopy and will think about whether they would prefer the nonoperative or surgical treatment option. \par  \par

## 2021-01-20 NOTE — HISTORY OF PRESENT ILLNESS
[Pain Location] : pain [Stable] : stable [5] : a current pain level of 5/10 [de-identified] : pt is here for f/u for left knee pain\par she continues to have medial posterior knee pain that is worse with steps and long standing\par she also c/o lateral knee to shin burning pain\par the Mobic has been helpful\par pt had MRI of left knee on 1/7/21 It demonstrates medial meniscus degenerative tear as well as mild to moderate medial and patellofemoral osteoarthritis.

## 2021-03-19 ENCOUNTER — APPOINTMENT (OUTPATIENT)
Dept: ORTHOPEDIC SURGERY | Facility: CLINIC | Age: 61
End: 2021-03-19
Payer: COMMERCIAL

## 2021-03-19 VITALS
DIASTOLIC BLOOD PRESSURE: 83 MMHG | SYSTOLIC BLOOD PRESSURE: 126 MMHG | HEIGHT: 61.5 IN | HEART RATE: 80 BPM | BODY MASS INDEX: 34.48 KG/M2 | TEMPERATURE: 97.2 F | WEIGHT: 185 LBS

## 2021-03-19 DIAGNOSIS — M71.21 SYNOVIAL CYST OF POPLITEAL SPACE [BAKER], RIGHT KNEE: ICD-10-CM

## 2021-03-19 DIAGNOSIS — M71.22 SYNOVIAL CYST OF POPLITEAL SPACE [BAKER], LEFT KNEE: ICD-10-CM

## 2021-03-19 PROCEDURE — 99213 OFFICE O/P EST LOW 20 MIN: CPT

## 2021-03-19 PROCEDURE — 99072 ADDL SUPL MATRL&STAF TM PHE: CPT

## 2021-03-19 NOTE — DISCUSSION/SUMMARY
[de-identified] : Surgical risks reviewed. 61 year old female with left knee degenerative medial meniscus tear, and mild osteoarthritis of the b/l knees. she presented with acute posterior knee pain which appears to be having symtomatic moderate size baker's cyst.  she also has right knee medial meniscus tear but she is pain free in the right knee.  Conservative therapy and surgical options discussed in detail with the patient. Pt opted to try u/s guided aspiration and injection for the bakers cyst.  pt is candidate for an arthroscopy. she will continue with meloxicam as needed. f/u in 6 weeks.\par \par \par The percentages of success in an arthroscopy that involves a torn meniscus and arthritic changes is dependent upon how bad the arthritic changes are. Basically, removing a meniscal tear allows us to ascertain how bad the patient's articular cartilage destruction (arthritis) is. The arthroscopy cleans out any debris from the arthritic process as well as removing the meniscal tear. Approximately 75% of the patients will say that they feel relief, although their x-rays will continue to show significant arthritic changes. Arthroscopy for arthritis is a temporizing procedure, yielding subjective success (patient satisfaction) for less than two to five years. In some cases, the knee might eventually require a knee replacement for symptomatic relief. The prognostic factors that are somewhat favorable predictive values in arthroscopic debridements (removal of loose articular cartilage, loose body and inflamed synovium) of an arthritic knee are: short duration of symptoms, effusion (swelling), minimal deformity and good range of motion. The complications with any arthroscopy include the risk of anaesthetic complications and death, blood clots and pulmonary embolus, infection (less than 1%), nerve damage, by which we would mean a peroneal palsy (less than 0.1%) (small area of skin numbness is so common, we do not consider its presence a complication), injury to the popliteal artery, which is so rare that there are no statistics, but should it occur could theoretically lead to amputation, which is extremely unlikely. There is often a chance of getting a hemarthrosis (blood in the joint) but this usually resolves with local measures of icing, physical therapy, and aspiration. Reflex sympathetic dystrophy (RSD) is another extremely rare but theoretical complication. This (RSD) means that the patient has a stiff painful joint that is out of proportion to the objective pathology of the knee. Subsequently, it might require years of physical therapy before one regains a functional knee with RSD. Infrapatellar contracture syndrome (stiff joint) is sometimes reported and associated with RSD, but it usually is a result of not being aggressive in physical therapy. I think the patient understands the risk benefit ratio of arthroscopy and will think about whether they would prefer the nonoperative or surgical treatment option. \par  \par

## 2021-03-19 NOTE — PHYSICAL EXAM
[de-identified] : GENERAL APPEARANCE: Well nourished and hydrated, pleasant, alert, and oriented x 3. Appears their stated age. \par HEENT: Normocephalic, extraocular eye motion intact. Nasal septum midline. Oral cavity clear. External auditory canal clear. \par RESPIRATORY: Breath sounds clear and audible in all lobes. No wheezing, No accessory muscle use.\par CARDIOVASCULAR: No apparent abnormalities. No lower leg edema. No varicosities. Pedal pulses are palpable.\par NEUROLOGIC: Sensation is normal, no muscle weakness in the upper or lower extremities.\par DERMATOLOGIC: No apparent skin lesions, moist, warm, no rash.\par SPINE: Cervical spine appears normal and moves freely; thoracic spine appears normal and moves freely; lumbosacral spine appears normal and moves freely, normal, nontender.\par MUSCULOSKELETAL: Hands, wrists, and elbows are normal and move freely, shoulders are normal and move freely. \par Musculoskeletal: Gait: normal and not antalgic . left hip exam shows able to SLR without pain, no pain with Stinchfield maneuver, FROM. \par left knee exam shows  pain with flexion, posterior knee tender, swollen,  mild joint effusion present, neutral alignment, preserved ROM, positive medial Kobe's test. \par  \par 5/5 motor strength in bilateral lower extremities. Sensory: Intact in bilateral lower extremities. DTRs: Biceps, brachioradialis, triceps, patellar, ankle and plantar 2+ and symmetric bilaterally. Pulses: dorsalis pedis, posterior tibial, femoral, popliteal, and radial 2+ and symmetric bilaterally. \par Constitutional: Alert and in no acute distress and obese, but well-appearing and not in acute distress. \par

## 2021-03-19 NOTE — HISTORY OF PRESENT ILLNESS
[Pain Location] : pain [Worsening] : worsening [___ days] : [unfilled] day(s) ago [7] : a current pain level of 7/10 [Walking] : worsened by walking [Rest] : relieved by rest [de-identified] : pt is here for f/u for left knee pain\par she  had MRI of left knee on 1/7/21 It demonstrates medial meniscus degenerative tear as well as mild to moderate medial and patellofemoral osteoarthritis.\par pt had cortisone injection and mobic and it has been helpful\par she also has similar xray and MRI of right knee but right knee is not hurting.\par \par pt reports few days of left hip pain from not walking normally then she developed significant posterior left knee pain.\par

## 2021-03-27 ENCOUNTER — RESULT REVIEW (OUTPATIENT)
Age: 61
End: 2021-03-27

## 2021-04-19 ENCOUNTER — OUTPATIENT (OUTPATIENT)
Dept: OUTPATIENT SERVICES | Facility: HOSPITAL | Age: 61
LOS: 1 days | End: 2021-04-19

## 2021-04-19 ENCOUNTER — APPOINTMENT (OUTPATIENT)
Dept: ULTRASOUND IMAGING | Facility: CLINIC | Age: 61
End: 2021-04-19
Payer: COMMERCIAL

## 2021-04-19 DIAGNOSIS — M71.21 SYNOVIAL CYST OF POPLITEAL SPACE [BAKER], RIGHT KNEE: ICD-10-CM

## 2021-04-19 DIAGNOSIS — Z98.891 HISTORY OF UTERINE SCAR FROM PREVIOUS SURGERY: Chronic | ICD-10-CM

## 2021-04-19 PROCEDURE — 76882 US LMTD JT/FCL EVL NVASC XTR: CPT | Mod: 26,LT

## 2021-07-21 ENCOUNTER — APPOINTMENT (OUTPATIENT)
Dept: ORTHOPEDIC SURGERY | Facility: CLINIC | Age: 61
End: 2021-07-21
Payer: COMMERCIAL

## 2021-07-21 VITALS
HEART RATE: 78 BPM | DIASTOLIC BLOOD PRESSURE: 84 MMHG | WEIGHT: 185 LBS | SYSTOLIC BLOOD PRESSURE: 127 MMHG | HEIGHT: 61 IN | BODY MASS INDEX: 34.93 KG/M2

## 2021-07-21 DIAGNOSIS — M17.12 UNILATERAL PRIMARY OSTEOARTHRITIS, LEFT KNEE: ICD-10-CM

## 2021-07-21 DIAGNOSIS — S83.242A OTHER TEAR OF MEDIAL MENISCUS, CURRENT INJURY, LEFT KNEE, INITIAL ENCOUNTER: ICD-10-CM

## 2021-07-21 PROCEDURE — 99213 OFFICE O/P EST LOW 20 MIN: CPT

## 2021-10-28 ENCOUNTER — APPOINTMENT (OUTPATIENT)
Dept: SURGERY | Facility: CLINIC | Age: 61
End: 2021-10-28

## 2021-12-07 ENCOUNTER — APPOINTMENT (OUTPATIENT)
Dept: SURGERY | Facility: CLINIC | Age: 61
End: 2021-12-07
Payer: COMMERCIAL

## 2021-12-07 DIAGNOSIS — C73 MALIGNANT NEOPLASM OF THYROID GLAND: ICD-10-CM

## 2021-12-07 PROCEDURE — 99214 OFFICE O/P EST MOD 30 MIN: CPT

## 2021-12-07 NOTE — PHYSICAL EXAM
[de-identified] : well healed scar [de-identified] : no palpable thyroid bed nodules [Laryngoscopy Performed] : laryngoscopy was performed, see procedure section for findings [Midline] : located in midline position [Normal] : orientation to person, place, and time: normal [de-identified] : negative Chvostek's sign

## 2021-12-07 NOTE — ASSESSMENT
[FreeTextEntry1] : will observe. blood tests per dr lima.. to return earlier if any change. patient has been given the opportunity to ask questions, and all of the patient's questions have been answered to their satisfaction\par

## 2021-12-07 NOTE — HISTORY OF PRESENT ILLNESS
[de-identified] : 3 1/2 years  s/p total thyroidectomy for malignancy. feels well on  Synthroid 112 mcg 5 doses weekly  and 125 mcg on Saturday and Sunday. denies dysphagia, hoarseness or perioral or  hand tingling. no changes medically since last visit. recent sonogram and CT MURPHY, bulging cervical disks.  TFT's reportedly normal.  I have reviewed all old and new data and available images.\par

## 2022-06-07 ENCOUNTER — APPOINTMENT (OUTPATIENT)
Dept: SURGERY | Facility: CLINIC | Age: 62
End: 2022-06-07

## 2022-08-12 NOTE — H&P PST ADULT - PAIN CHRONIC, PROFILE
Provided pt with more warm blankets. Pt resting in bed and breathing with even, unlabored respirations.   no

## 2022-10-03 NOTE — ASSESSMENT
Patient calls stating she was in on 9/27/22 and she Dr. Cali. Patient received cortisone injections in her left knee, left hip and right hip. Patient reports the right hip is doing well and she is now able to sleep on it. She reports the left side is in more pain than prior to the injections. She reports pain from her shin to her hip/inner groin. Patient states tylenol does not help and she is unable to take ibuprofen. Patient is under contract with Pain Management for her left side. She also states she can't ice due to hypersensitivity and heat doesn't help. Patient would like to know what Dr. Cali recommends next.    [FreeTextEntry1] : will observe. blood tests per dr lima.. to return earlier if any change

## 2023-05-11 NOTE — BRIEF OPERATIVE NOTE - PROCEDURE
<<-----Click on this checkbox to enter Procedure Total thyroidectomy with paraglandular node dissection  05/07/2018    Active  ADRIANAL Wartpeel Counseling:  I discussed with the patient the risks of Wartpeel including but not limited to erythema, scaling, itching, weeping, crusting, and pain.

## 2025-08-25 ENCOUNTER — APPOINTMENT (OUTPATIENT)
Dept: ORTHOPEDIC SURGERY | Facility: CLINIC | Age: 65
End: 2025-08-25
Payer: MEDICARE

## 2025-08-25 VITALS
SYSTOLIC BLOOD PRESSURE: 122 MMHG | DIASTOLIC BLOOD PRESSURE: 82 MMHG | HEART RATE: 82 BPM | BODY MASS INDEX: 35.12 KG/M2 | HEIGHT: 61 IN | WEIGHT: 186 LBS

## 2025-08-25 DIAGNOSIS — M17.11 UNILATERAL PRIMARY OSTEOARTHRITIS, RIGHT KNEE: ICD-10-CM

## 2025-08-25 PROCEDURE — 20610 DRAIN/INJ JOINT/BURSA W/O US: CPT | Mod: RT

## 2025-08-25 PROCEDURE — 73564 X-RAY EXAM KNEE 4 OR MORE: CPT | Mod: RT

## 2025-08-25 PROCEDURE — 99204 OFFICE O/P NEW MOD 45 MIN: CPT | Mod: 25
